# Patient Record
Sex: MALE | URBAN - METROPOLITAN AREA
[De-identification: names, ages, dates, MRNs, and addresses within clinical notes are randomized per-mention and may not be internally consistent; named-entity substitution may affect disease eponyms.]

---

## 2020-01-14 ENCOUNTER — APPOINTMENT (RX ONLY)
Age: 60
Setting detail: DERMATOLOGY
End: 2020-01-14

## 2020-01-14 DIAGNOSIS — D22 MELANOCYTIC NEVI: ICD-10-CM

## 2020-01-14 DIAGNOSIS — Z87.2 PERSONAL HISTORY OF DISEASES OF THE SKIN AND SUBCUTANEOUS TISSUE: ICD-10-CM

## 2020-01-14 DIAGNOSIS — L82.1 OTHER SEBORRHEIC KERATOSIS: ICD-10-CM

## 2020-01-14 DIAGNOSIS — D49.2 NEOPLASM OF UNSPECIFIED BEHAVIOR OF BONE, SOFT TISSUE, AND SKIN: ICD-10-CM

## 2020-01-14 PROBLEM — D22.5 MELANOCYTIC NEVI OF TRUNK: Status: ACTIVE | Noted: 2020-01-14

## 2020-01-14 PROCEDURE — ? BIOPSY BY SHAVE METHOD

## 2020-01-14 PROCEDURE — ? COUNSELING

## 2020-01-14 PROCEDURE — 99202 OFFICE O/P NEW SF 15 MIN: CPT | Mod: 25

## 2020-01-14 PROCEDURE — ? OTHER

## 2020-01-14 PROCEDURE — 11102 TANGNTL BX SKIN SINGLE LES: CPT

## 2020-01-14 ASSESSMENT — LOCATION DETAILED DESCRIPTION DERM
LOCATION DETAILED: SUPERIOR LUMBAR SPINE
LOCATION DETAILED: LEFT MID-UPPER BACK
LOCATION DETAILED: RIGHT INFERIOR LATERAL UPPER BACK
LOCATION DETAILED: RIGHT SUPERIOR LATERAL UPPER BACK

## 2020-01-14 ASSESSMENT — LOCATION SIMPLE DESCRIPTION DERM
LOCATION SIMPLE: LOWER BACK
LOCATION SIMPLE: RIGHT UPPER BACK
LOCATION SIMPLE: LEFT UPPER BACK

## 2020-01-14 ASSESSMENT — LOCATION ZONE DERM: LOCATION ZONE: TRUNK

## 2020-01-14 NOTE — PROCEDURE: OTHER
Other (Free Text): photos taken of nevi on back for monitoring purposes
Note Text (......Xxx Chief Complaint.): This diagnosis correlates with the
Detail Level: Simple

## 2020-01-14 NOTE — HPI: FREE FORM (INITIAL HISTORY)
How Severe Is Your Skin Condition? (The Patient Describes The Severity Level As....): moderate
What Brings You In Today? (This Is An Xx Year Old Patient Who Presents For...): skin lesion
When Did You First Notice It? (The Patient First Noticed It...): 6 months ago
Where On Your Body Is It? (Located On...): right upper back
Any Associated Symptoms? (The Symptoms Include.....): irregular, changing
What Previous Treatments Have You Tried? (The Patient Has Tried The Following Treatments...): no treatment
Did Anything Happen To Make You Want To Come In For This Specifically Today? (The Specific Reason That The Patient Came In Today Was Because:): evaluation and management. He is accompanied by his wife, who has noticed the changes.\\nHe has a hx of atypical nevi on back. Has been previous followed at [facility] by Dr. Emmie Goddard.

## 2020-01-14 NOTE — PROCEDURE: BIOPSY BY SHAVE METHOD
Size Of Lesion In Cm: 0
Hemostasis: Aluminum Chloride
Consent: Verbal consent was obtained and risks were reviewed including but not limited to scarring, infection, bleeding, scabbing, incomplete removal, nerve damage and allergy to anesthesia.
Cryotherapy Text: The wound bed was treated with cryotherapy after the biopsy was performed.
Biopsy Method: Dermablade
Electrodesiccation Text: The wound bed was treated with electrodesiccation after the biopsy was performed.
Electrodesiccation And Curettage Text: The wound bed was treated with electrodesiccation and curettage after the biopsy was performed.
Type Of Destruction Used: Electrodesiccation and Curettage
Hide Topical Anesthesia?: No
Detail Level: Simple
Body Location Override (Optional - Billing Will Still Be Based On Selected Body Map Location If Applicable): right upper back
Wound Care: Aquaphor
Render Post-Care Instructions In Note?: yes
Silver Nitrate Text: The wound bed was treated with silver nitrate after the biopsy was performed.
Billing Type: Third-Party Bill
Notification Instructions: Patient will be notified of biopsy results. However, patient instructed to call the office if not contacted within 2 weeks.
Biopsy Type: H and E
Depth Of Biopsy: dermis
Dressing: pressure dressing with telfa
Post-Care Instructions: Patient given \"[facility]\" handout for post care instructions.
Anesthesia Volume In Cc: 0.5

## 2020-12-21 ENCOUNTER — APPOINTMENT (RX ONLY)
Age: 60
Setting detail: DERMATOLOGY
End: 2020-12-21

## 2020-12-21 VITALS — TEMPERATURE: 97 F

## 2020-12-21 DIAGNOSIS — D22 MELANOCYTIC NEVI: ICD-10-CM

## 2020-12-21 DIAGNOSIS — L82.0 INFLAMED SEBORRHEIC KERATOSIS: ICD-10-CM

## 2020-12-21 PROBLEM — D22.39 MELANOCYTIC NEVI OF OTHER PARTS OF FACE: Status: ACTIVE | Noted: 2020-12-21

## 2020-12-21 PROBLEM — D22.4 MELANOCYTIC NEVI OF SCALP AND NECK: Status: ACTIVE | Noted: 2020-12-21

## 2020-12-21 PROCEDURE — 99213 OFFICE O/P EST LOW 20 MIN: CPT

## 2020-12-21 PROCEDURE — ? COUNSELING

## 2020-12-21 ASSESSMENT — LOCATION ZONE DERM
LOCATION ZONE: NECK
LOCATION ZONE: FACE
LOCATION ZONE: SCALP

## 2020-12-21 ASSESSMENT — LOCATION SIMPLE DESCRIPTION DERM
LOCATION SIMPLE: INFERIOR FOREHEAD
LOCATION SIMPLE: POSTERIOR SCALP
LOCATION SIMPLE: LEFT CHEEK
LOCATION SIMPLE: NECK

## 2020-12-21 ASSESSMENT — LOCATION DETAILED DESCRIPTION DERM
LOCATION DETAILED: LEFT CENTRAL LATERAL NECK
LOCATION DETAILED: INFERIOR MID FOREHEAD
LOCATION DETAILED: MID-OCCIPITAL SCALP
LOCATION DETAILED: LEFT INFERIOR CENTRAL MALAR CHEEK

## 2021-12-15 ENCOUNTER — APPOINTMENT (RX ONLY)
Age: 61
Setting detail: DERMATOLOGY
End: 2021-12-15

## 2021-12-15 DIAGNOSIS — D22 MELANOCYTIC NEVI: ICD-10-CM

## 2021-12-15 DIAGNOSIS — L72.0 EPIDERMAL CYST: ICD-10-CM

## 2021-12-15 DIAGNOSIS — L82.0 INFLAMED SEBORRHEIC KERATOSIS: ICD-10-CM

## 2021-12-15 PROBLEM — D22.5 MELANOCYTIC NEVI OF TRUNK: Status: ACTIVE | Noted: 2021-12-15

## 2021-12-15 PROCEDURE — ? COUNSELING

## 2021-12-15 PROCEDURE — ? LIQUID NITROGEN

## 2021-12-15 PROCEDURE — ? OTHER

## 2021-12-15 PROCEDURE — 99213 OFFICE O/P EST LOW 20 MIN: CPT | Mod: 25

## 2021-12-15 PROCEDURE — ? DEFER

## 2021-12-15 PROCEDURE — 17110 DESTRUCTION B9 LES UP TO 14: CPT

## 2021-12-15 ASSESSMENT — LOCATION DETAILED DESCRIPTION DERM
LOCATION DETAILED: RIGHT POSTERIOR SHOULDER
LOCATION DETAILED: LEFT MID-UPPER BACK
LOCATION DETAILED: LEFT CENTRAL FRONTAL SCALP

## 2021-12-15 ASSESSMENT — LOCATION SIMPLE DESCRIPTION DERM
LOCATION SIMPLE: LEFT SCALP
LOCATION SIMPLE: LEFT UPPER BACK
LOCATION SIMPLE: RIGHT SHOULDER

## 2021-12-15 ASSESSMENT — LOCATION ZONE DERM
LOCATION ZONE: ARM
LOCATION ZONE: TRUNK
LOCATION ZONE: SCALP

## 2021-12-15 NOTE — HPI: FREE FORM (INITIAL HISTORY)
What Brings You In Today? (This Is An Xx Year Old Patient Who Presents For...): skin lesion
When Did You First Notice It? (The Patient First Noticed It...): months ago
Where On Your Body Is It? (Located On...): left upper forehead
Any Associated Symptoms? (The Symptoms Include.....): irritated
What Previous Treatments Have You Tried? (The Patient Has Tried The Following Treatments...): no treatment
Did Anything Happen To Make You Want To Come In For This Specifically Today? (The Specific Reason That The Patient Came In Today Was Because:): evaluation and management\\nHe also had a small cyst recently drained by his PCP on right shoulder

## 2021-12-15 NOTE — PROCEDURE: DEFER
Introduction Text (Please End With A Colon): The following procedure was deferred today:
Detail Level: Simple
Instructions (Optional): Will call to schedule re-excision

## 2021-12-15 NOTE — PROCEDURE: OTHER
Detail Level: Simple
Render Risk Assessment In Note?: no
Other (Free Text): no changes from previous photos
Note Text (......Xxx Chief Complaint.): This diagnosis correlates with the
Other (Free Text): lesion is small, recent I&D. He will call to schedule excision with RG when inflammation improves

## 2021-12-15 NOTE — PROCEDURE: LIQUID NITROGEN
Detail Level: Simple
Show Applicator Variable?: Yes
Consent: The patient's consent was obtained including but not limited to risks of crusting, scabbing, blistering, scarring, darker or lighter pigmentary change, recurrence, incomplete removal and infection. The patient agrees to f/u if the lesion does not resolve with this treatment.
Include Z78.9 (Other Specified Conditions Influencing Health Status) As An Associated Diagnosis?: No
Post-Care Instructions: Patient was given [facility] Handout on post-liquid nitrogen wound care.
Medical Necessity Information: It is in your best interest to select a reason for this procedure from the list below. All of these items fulfill various CMS LCD requirements except the new and changing color options.
Medical Necessity Clause: This procedure was medically necessary because the lesion that was treated was:

## 2023-12-05 ENCOUNTER — APPOINTMENT (RX ONLY)
Age: 63
Setting detail: DERMATOLOGY
End: 2023-12-05

## 2023-12-05 DIAGNOSIS — D49.2 NEOPLASM OF UNSPECIFIED BEHAVIOR OF BONE, SOFT TISSUE, AND SKIN: ICD-10-CM

## 2023-12-05 PROCEDURE — ? BIOPSY BY SHAVE METHOD

## 2023-12-05 PROCEDURE — 11102 TANGNTL BX SKIN SINGLE LES: CPT

## 2023-12-05 ASSESSMENT — LOCATION ZONE DERM: LOCATION ZONE: TRUNK

## 2023-12-05 ASSESSMENT — LOCATION SIMPLE DESCRIPTION DERM: LOCATION SIMPLE: LEFT UPPER BACK

## 2023-12-05 ASSESSMENT — LOCATION DETAILED DESCRIPTION DERM: LOCATION DETAILED: LEFT SUPERIOR MEDIAL UPPER BACK

## 2023-12-05 NOTE — HPI: SKIN LESION
How Severe Is Your Skin Lesion?: moderate
Has Your Skin Lesion Been Treated?: not been treated
Is This A New Presentation, Or A Follow-Up?: Mole
Additional History: Pt would like mole removed for cosmetic reasons.

## 2023-12-05 NOTE — PROCEDURE: BIOPSY BY SHAVE METHOD
Render Path Notes In Note?: No
Biopsy Method: double edge Personna blade
Billing Type: Third-Party Bill
Render Post-Care Instructions In Note?: yes
Detail Level: Detailed
Consent: Verbal consent was obtained and risks were reviewed including but not limited to scarring, infection, bleeding, scabbing, incomplete removal, nerve damage and allergy to anesthesia.
Dressing: pressure dressing with telfa
Size Of Lesion In Cm: 0
Anesthesia Volume In Cc: 1
Depth Of Biopsy: dermis
Anesthesia Type: 2% lidocaine with epinephrine
Notification Instructions: Patient will be notified of biopsy results. However, patient instructed to call the office if not contacted within 2 weeks.
Post-Care Instructions: Patient given \"[facility]\" handout for post care instructions.
Wound Care: Aquaphor
Silver Nitrate Text: The wound bed was treated with silver nitrate after the biopsy was performed.
Cryotherapy Text: The wound bed was treated with cryotherapy after the biopsy was performed.
Biopsy Type: H and E
Hemostasis: Drysol
Electrodesiccation And Curettage Text: The wound bed was treated with electrodesiccation and curettage after the biopsy was performed.
Body Location Override (Optional - Billing Will Still Be Based On Selected Body Map Location If Applicable): posterior neck
Electrodesiccation Text: The wound bed was treated with electrodesiccation after the biopsy was performed.
Information: Selecting Yes will display possible errors in your note based on the variables you have selected. This validation is only offered as a suggestion for you. PLEASE NOTE THAT THE VALIDATION TEXT WILL BE REMOVED WHEN YOU FINALIZE YOUR NOTE. IF YOU WANT TO FAX A PRELIMINARY NOTE YOU WILL NEED TO TOGGLE THIS TO 'NO' IF YOU DO NOT WANT IT IN YOUR FAXED NOTE.
Type Of Destruction Used: Electrodesiccation and Curettage